# Patient Record
Sex: MALE | Race: WHITE | NOT HISPANIC OR LATINO | URBAN - METROPOLITAN AREA
[De-identification: names, ages, dates, MRNs, and addresses within clinical notes are randomized per-mention and may not be internally consistent; named-entity substitution may affect disease eponyms.]

---

## 2020-10-02 ENCOUNTER — OFFICE VISIT (OUTPATIENT)
Dept: URBAN - METROPOLITAN AREA CLINIC 68 | Facility: CLINIC | Age: 80
End: 2020-10-02

## 2020-10-05 ENCOUNTER — TELEPHONE ENCOUNTER (OUTPATIENT)
Dept: URBAN - METROPOLITAN AREA CLINIC 68 | Facility: CLINIC | Age: 80
End: 2020-10-05

## 2020-10-30 ENCOUNTER — OFFICE VISIT (OUTPATIENT)
Dept: URBAN - METROPOLITAN AREA SURGERY CENTER 12 | Facility: SURGERY CENTER | Age: 80
End: 2020-10-30

## 2020-11-02 ENCOUNTER — OFFICE VISIT (OUTPATIENT)
Dept: URBAN - METROPOLITAN AREA SURGERY CENTER 12 | Facility: SURGERY CENTER | Age: 80
End: 2020-11-02

## 2021-01-01 ENCOUNTER — OFFICE VISIT (OUTPATIENT)
Dept: URBAN - METROPOLITAN AREA CLINIC 68 | Facility: CLINIC | Age: 81
End: 2021-01-01

## 2021-01-21 ENCOUNTER — LAB OUTSIDE AN ENCOUNTER (OUTPATIENT)
Dept: URBAN - METROPOLITAN AREA CLINIC 68 | Facility: CLINIC | Age: 81
End: 2021-01-21

## 2021-02-11 ENCOUNTER — OFFICE VISIT (OUTPATIENT)
Dept: URBAN - METROPOLITAN AREA CLINIC 68 | Facility: CLINIC | Age: 81
End: 2021-02-11

## 2021-02-18 ENCOUNTER — TELEPHONE ENCOUNTER (OUTPATIENT)
Dept: URBAN - METROPOLITAN AREA CLINIC 68 | Facility: CLINIC | Age: 81
End: 2021-02-18

## 2021-03-29 ENCOUNTER — OFFICE VISIT (OUTPATIENT)
Dept: URBAN - METROPOLITAN AREA CLINIC 68 | Facility: CLINIC | Age: 81
End: 2021-03-29

## 2021-04-19 ENCOUNTER — OFFICE VISIT (OUTPATIENT)
Dept: URBAN - METROPOLITAN AREA CLINIC 68 | Facility: CLINIC | Age: 81
End: 2021-04-19

## 2021-09-03 ENCOUNTER — OFFICE VISIT (OUTPATIENT)
Dept: URBAN - METROPOLITAN AREA CLINIC 68 | Facility: CLINIC | Age: 81
End: 2021-09-03

## 2021-09-29 ENCOUNTER — OFFICE VISIT (OUTPATIENT)
Dept: URBAN - METROPOLITAN AREA CLINIC 68 | Facility: CLINIC | Age: 81
End: 2021-09-29

## 2021-10-04 ENCOUNTER — LAB OUTSIDE AN ENCOUNTER (OUTPATIENT)
Dept: URBAN - METROPOLITAN AREA CLINIC 68 | Facility: CLINIC | Age: 81
End: 2021-10-04

## 2021-10-05 ENCOUNTER — OFFICE VISIT (OUTPATIENT)
Dept: URBAN - METROPOLITAN AREA SURGERY CENTER 12 | Facility: SURGERY CENTER | Age: 81
End: 2021-10-05

## 2021-10-06 ENCOUNTER — LAB OUTSIDE AN ENCOUNTER (OUTPATIENT)
Dept: URBAN - METROPOLITAN AREA CLINIC 68 | Facility: CLINIC | Age: 81
End: 2021-10-06

## 2021-10-06 LAB — 01: (no result)

## 2021-10-07 ENCOUNTER — TELEPHONE ENCOUNTER (OUTPATIENT)
Dept: URBAN - METROPOLITAN AREA CLINIC 68 | Facility: CLINIC | Age: 81
End: 2021-10-07

## 2021-10-08 ENCOUNTER — OFFICE VISIT (OUTPATIENT)
Dept: URBAN - METROPOLITAN AREA CLINIC 68 | Facility: CLINIC | Age: 81
End: 2021-10-08

## 2021-10-20 ENCOUNTER — OFFICE VISIT (OUTPATIENT)
Dept: URBAN - METROPOLITAN AREA CLINIC 68 | Facility: CLINIC | Age: 81
End: 2021-10-20

## 2021-11-09 ENCOUNTER — TELEPHONE ENCOUNTER (OUTPATIENT)
Dept: URBAN - METROPOLITAN AREA CLINIC 68 | Facility: CLINIC | Age: 81
End: 2021-11-09

## 2021-11-12 ENCOUNTER — OFFICE VISIT (OUTPATIENT)
Dept: URBAN - METROPOLITAN AREA CLINIC 68 | Facility: CLINIC | Age: 81
End: 2021-11-12

## 2021-11-22 ENCOUNTER — TELEPHONE ENCOUNTER (OUTPATIENT)
Dept: URBAN - METROPOLITAN AREA CLINIC 68 | Facility: CLINIC | Age: 81
End: 2021-11-22

## 2021-11-29 ENCOUNTER — OFFICE VISIT (OUTPATIENT)
Dept: URBAN - METROPOLITAN AREA CLINIC 68 | Facility: CLINIC | Age: 81
End: 2021-11-29

## 2021-11-30 ENCOUNTER — TELEPHONE ENCOUNTER (OUTPATIENT)
Dept: URBAN - METROPOLITAN AREA CLINIC 68 | Facility: CLINIC | Age: 81
End: 2021-11-30

## 2021-12-02 ENCOUNTER — TELEPHONE ENCOUNTER (OUTPATIENT)
Dept: URBAN - METROPOLITAN AREA CLINIC 68 | Facility: CLINIC | Age: 81
End: 2021-12-02

## 2021-12-10 ENCOUNTER — TELEPHONE ENCOUNTER (OUTPATIENT)
Dept: URBAN - METROPOLITAN AREA CLINIC 68 | Facility: CLINIC | Age: 81
End: 2021-12-10

## 2021-12-10 ENCOUNTER — OFFICE VISIT (OUTPATIENT)
Dept: URBAN - METROPOLITAN AREA CLINIC 68 | Facility: CLINIC | Age: 81
End: 2021-12-10

## 2022-01-10 ENCOUNTER — OFFICE VISIT (OUTPATIENT)
Dept: URBAN - METROPOLITAN AREA CLINIC 68 | Facility: CLINIC | Age: 82
End: 2022-01-10

## 2022-01-17 ENCOUNTER — TELEPHONE ENCOUNTER (OUTPATIENT)
Dept: URBAN - METROPOLITAN AREA CLINIC 68 | Facility: CLINIC | Age: 82
End: 2022-01-17

## 2022-01-20 ENCOUNTER — TELEPHONE ENCOUNTER (OUTPATIENT)
Dept: URBAN - METROPOLITAN AREA CLINIC 68 | Facility: CLINIC | Age: 82
End: 2022-01-20

## 2022-01-21 ENCOUNTER — OFFICE VISIT (OUTPATIENT)
Dept: URBAN - METROPOLITAN AREA SURGERY CENTER 12 | Facility: SURGERY CENTER | Age: 82
End: 2022-01-21

## 2022-01-25 ENCOUNTER — LAB OUTSIDE AN ENCOUNTER (OUTPATIENT)
Dept: URBAN - METROPOLITAN AREA CLINIC 68 | Facility: CLINIC | Age: 82
End: 2022-01-25

## 2022-01-25 LAB — 01: (no result)

## 2022-01-27 ENCOUNTER — TELEPHONE ENCOUNTER (OUTPATIENT)
Dept: URBAN - METROPOLITAN AREA CLINIC 68 | Facility: CLINIC | Age: 82
End: 2022-01-27

## 2022-01-27 ENCOUNTER — OFFICE VISIT (OUTPATIENT)
Dept: URBAN - METROPOLITAN AREA CLINIC 68 | Facility: CLINIC | Age: 82
End: 2022-01-27

## 2022-02-02 ENCOUNTER — TELEPHONE ENCOUNTER (OUTPATIENT)
Dept: URBAN - METROPOLITAN AREA CLINIC 68 | Facility: CLINIC | Age: 82
End: 2022-02-02

## 2022-02-03 ENCOUNTER — OFFICE VISIT (OUTPATIENT)
Dept: URBAN - METROPOLITAN AREA SURGERY CENTER 12 | Facility: SURGERY CENTER | Age: 82
End: 2022-02-03

## 2022-02-03 ENCOUNTER — TELEPHONE ENCOUNTER (OUTPATIENT)
Dept: URBAN - METROPOLITAN AREA CLINIC 68 | Facility: CLINIC | Age: 82
End: 2022-02-03

## 2022-02-14 ENCOUNTER — OFFICE VISIT (OUTPATIENT)
Dept: URBAN - METROPOLITAN AREA CLINIC 68 | Facility: CLINIC | Age: 82
End: 2022-02-14

## 2022-04-14 ENCOUNTER — OFFICE VISIT (OUTPATIENT)
Dept: URBAN - METROPOLITAN AREA CLINIC 68 | Facility: CLINIC | Age: 82
End: 2022-04-14

## 2022-05-12 ENCOUNTER — ANTICOAGULATION VISIT (OUTPATIENT)
Dept: CARDIOLOGY | Facility: CLINIC | Age: 82
End: 2022-05-12

## 2022-05-19 ENCOUNTER — TELEPHONE ENCOUNTER (OUTPATIENT)
Dept: URBAN - METROPOLITAN AREA CLINIC 68 | Facility: CLINIC | Age: 82
End: 2022-05-19

## 2022-06-04 ENCOUNTER — TELEPHONE ENCOUNTER (OUTPATIENT)
Dept: URBAN - METROPOLITAN AREA CLINIC 68 | Facility: CLINIC | Age: 82
End: 2022-06-04

## 2022-06-04 RX ORDER — POLYETHYLENE GLYCOL 3350, SODIUM SULFATE, SODIUM CHLORIDE, POTASSIUM CHLORIDE, ASCORBIC ACID, SODIUM ASCORBATE 7.5-2.691G
KIT ORAL AS DIRECTED
Qty: 1 | Refills: 0 | OUTPATIENT
Start: 2015-07-13 | End: 2015-07-14

## 2022-06-04 RX ORDER — OMEPRAZOLE 20 MG/1
OMEPRAZOLE( 20MG ORAL 1 DAILY ) INACTIVE -HX ENTRY TABLET, DELAYED RELEASE ORAL DAILY
OUTPATIENT
Start: 2021-09-29

## 2022-06-04 RX ORDER — AMOXICILLIN AND CLAVULANATE POTASSIUM 875; 125 MG/1; MG/1
AMOXICILLIN-POT CLAVULANATE( 875-125MG ORAL   ) INACTIVE -HX ENTRY TABLET, FILM COATED ORAL
OUTPATIENT
Start: 2021-11-09

## 2022-06-04 RX ORDER — RIVAROXABAN 10 MG/1
XARELTO( 10MG ORAL  DAILY ) INACTIVE -HX ENTRY TABLET, FILM COATED ORAL DAILY
OUTPATIENT
Start: 2020-10-02

## 2022-06-04 RX ORDER — FLUNISOLIDE 0.25 MG/ML
FLUNISOLIDE( 25 MCG/ACT(0.025%) NASAL  TWO TIMES DAILY ) INACTIVE -HX ENTRY SOLUTION NASAL
OUTPATIENT
Start: 2020-10-02

## 2022-06-04 RX ORDER — CETIRIZINE HYDROCHLORIDE 10 MG/1
CETIRIZINE HCL( 10MG ORAL 1 DAILY ) INACTIVE -HX ENTRY TABLET, FILM COATED ORAL DAILY
OUTPATIENT
Start: 2021-09-29

## 2022-06-04 RX ORDER — LISINOPRIL AND HYDROCHLOROTHIAZIDE TABLETS 20; 25 MG/1; MG/1
LISINOPRIL-HYDROCHLOROTHIAZIDE( 20-25MG ORAL  DAILY ) INACTIVE -HX ENTRY TABLET ORAL DAILY
OUTPATIENT
Start: 2020-10-02

## 2022-06-04 RX ORDER — POLYETHYLENE GLYCOL 3350 17 G/DOSE
POWDER (GRAM) ORAL
Qty: 1 | Refills: 0 | OUTPATIENT
Start: 2021-02-11 | End: 2021-03-13

## 2022-06-04 RX ORDER — SODIUM SULFATE, POTASSIUM SULFATE, MAGNESIUM SULFATE 17.5; 3.13; 1.6 G/ML; G/ML; G/ML
SOLUTION, CONCENTRATE ORAL AS DIRECTED
Qty: 1 | Refills: 0 | OUTPATIENT
Start: 2020-10-02 | End: 2020-10-03

## 2022-06-04 RX ORDER — FAMOTIDINE 10 MG/1
TABLET, FILM COATED ORAL
Qty: 60 | Refills: 0 | OUTPATIENT
Start: 2021-10-08 | End: 2021-11-07

## 2022-06-05 ENCOUNTER — TELEPHONE ENCOUNTER (OUTPATIENT)
Dept: URBAN - METROPOLITAN AREA CLINIC 68 | Facility: CLINIC | Age: 82
End: 2022-06-05

## 2022-06-05 RX ORDER — OMEPRAZOLE 40 MG/1
CAPSULE, DELAYED RELEASE PELLETS ORAL EVERY 12 HOURS
Qty: 180 | Refills: 180 | Status: ACTIVE | COMMUNITY
Start: 2022-02-14

## 2022-06-05 RX ORDER — GUAIFENESIN 400 MG 400 MG/1
GUAIFENESIN( 400MG ORAL 1 TWICE A DAY ) ACTIVE -HX ENTRY TABLET ORAL TWICE A DAY
Status: ACTIVE | COMMUNITY
Start: 2022-02-14

## 2022-06-05 RX ORDER — HYDRALAZINE HYDROCHLORIDE 25 MG/1
HYDRALAZINE HCL( 25MG ORAL   ) ACTIVE -HX ENTRY TABLET ORAL
Status: ACTIVE | COMMUNITY
Start: 2022-02-14

## 2022-06-05 RX ORDER — LISINOPRIL 40 MG/1
LISINOPRIL( 40MG ORAL 1 DAILY ) ACTIVE -HX ENTRY TABLET ORAL DAILY
Status: ACTIVE | COMMUNITY
Start: 2022-02-14

## 2022-06-05 RX ORDER — DABIGATRAN ETEXILATE MESYLATE 150 MG/1
PRADAXA( 150MG ORAL 1 TWICE A DAY ) ACTIVE -HX ENTRY CAPSULE ORAL TWICE A DAY
Status: ACTIVE | COMMUNITY
Start: 2022-02-14

## 2022-06-05 RX ORDER — BUDESONIDE AND FORMOTEROL FUMARATE DIHYDRATE 160; 4.5 UG/1; UG/1
SYMBICORT( 160-4.5MCG/ACT INHALATION  TWO TIMES DAILY ) ACTIVE -HX ENTRY AEROSOL RESPIRATORY (INHALATION)
Status: ACTIVE | COMMUNITY
Start: 2022-02-14

## 2022-06-05 RX ORDER — ROSUVASTATIN CALCIUM 40 MG/1
ROSUVASTATIN CALCIUM( 40MG ORAL  DAILY ) ACTIVE -HX ENTRY TABLET, FILM COATED ORAL DAILY
Status: ACTIVE | COMMUNITY
Start: 2022-02-14

## 2022-06-05 RX ORDER — FINASTERIDE 5 MG/1
FINASTERIDE( 5MG ORAL   ) ACTIVE -HX ENTRY TABLET, FILM COATED ORAL
Status: ACTIVE | COMMUNITY
Start: 2022-02-14

## 2022-06-05 RX ORDER — SILODOSIN 4 MG/1
SILODOSIN( 4MG ORAL   ) ACTIVE -HX ENTRY CAPSULE ORAL
Status: ACTIVE | COMMUNITY
Start: 2022-02-14

## 2022-06-05 RX ORDER — AMLODIPINE BESYLATE 10 MG/1
AMLODIPINE BESYLATE( 10MG ORAL 1/2 DAILY ) ACTIVE -HX ENTRY TABLET ORAL DAILY
Status: ACTIVE | COMMUNITY
Start: 2022-02-14

## 2022-06-05 RX ORDER — ALPRAZOLAM 0.5 MG/1
ALPRAZOLAM( 0.5MG ORAL 1 THREE TIMES ) ACTIVE -HX ENTRY TABLET ORAL
Status: ACTIVE | COMMUNITY
Start: 2022-02-14

## 2022-06-09 ENCOUNTER — OFFICE VISIT (OUTPATIENT)
Dept: URBAN - METROPOLITAN AREA CLINIC 68 | Facility: CLINIC | Age: 82
End: 2022-06-09

## 2022-06-23 ENCOUNTER — OFFICE VISIT (OUTPATIENT)
Dept: URBAN - METROPOLITAN AREA CLINIC 68 | Facility: CLINIC | Age: 82
End: 2022-06-23

## 2022-06-25 ENCOUNTER — TELEPHONE ENCOUNTER (OUTPATIENT)
Age: 82
End: 2022-06-25

## 2022-06-25 RX ORDER — TAMSULOSIN HYDROCHLORIDE 0.4 MG/1
TAMSULOSIN HCL( 0.4MG ORAL 1 DAILY ) INACTIVE -HX ENTRY CAPSULE ORAL DAILY
OUTPATIENT
Start: 2021-09-29

## 2022-06-25 RX ORDER — FLUNISOLIDE 0.25 MG/ML
FLUNISOLIDE( 25 MCG/ACT(0.025%) NASAL  TWO TIMES DAILY ) INACTIVE -HX ENTRY SOLUTION NASAL
OUTPATIENT
Start: 2020-10-02

## 2022-06-25 RX ORDER — LISINOPRIL AND HYDROCHLOROTHIAZIDE TABLETS 20; 25 MG/1; MG/1
LISINOPRIL-HYDROCHLOROTHIAZIDE( 20-25MG ORAL  DAILY ) INACTIVE -HX ENTRY TABLET ORAL DAILY
OUTPATIENT
Start: 2020-10-02

## 2022-06-25 RX ORDER — AMOXICILLIN AND CLAVULANATE POTASSIUM 875; 125 MG/1; MG/1
AMOXICILLIN-POT CLAVULANATE( 875-125MG ORAL   ) INACTIVE -HX ENTRY TABLET, COATED ORAL
OUTPATIENT
Start: 2021-11-09

## 2022-06-25 RX ORDER — OMEPRAZOLE 20 MG/1
OMEPRAZOLE( 20MG ORAL 1 DAILY ) INACTIVE -HX ENTRY TABLET, DELAYED RELEASE ORAL DAILY
OUTPATIENT
Start: 2021-09-29

## 2022-06-25 RX ORDER — SODIUM SULFATE, POTASSIUM SULFATE, MAGNESIUM SULFATE 17.5; 3.13; 1.6 G/ML; G/ML; G/ML
SOLUTION, CONCENTRATE ORAL AS DIRECTED
Qty: 1 | Refills: 0 | OUTPATIENT
Start: 2020-10-02 | End: 2020-10-03

## 2022-06-25 RX ORDER — FAMOTIDINE 10 MG/1
TABLET, FILM COATED ORAL
Qty: 60 | Refills: 0 | OUTPATIENT
Start: 2021-10-08 | End: 2021-11-07

## 2022-06-25 RX ORDER — RIVAROXABAN 10 MG/1
XARELTO( 10MG ORAL  DAILY ) INACTIVE -HX ENTRY TABLET, FILM COATED ORAL DAILY
OUTPATIENT
Start: 2020-10-02

## 2022-06-25 RX ORDER — CETIRIZINE HYDROCHLORIDE 10 MG/1
CETIRIZINE HCL( 10MG ORAL 1 DAILY ) INACTIVE -HX ENTRY TABLET, FILM COATED ORAL DAILY
OUTPATIENT
Start: 2021-09-29

## 2022-06-25 RX ORDER — LORATADINE 5 MG
TABLET,CHEWABLE ORAL
Qty: 1 | Refills: 0 | OUTPATIENT
Start: 2021-02-11 | End: 2021-03-13

## 2022-06-25 RX ORDER — POLYETHYLENE GLYCOL 3350, SODIUM SULFATE, SODIUM CHLORIDE, POTASSIUM CHLORIDE, ASCORBIC ACID, SODIUM ASCORBATE 7.5-2.691G
KIT ORAL AS DIRECTED
Qty: 1 | Refills: 0 | OUTPATIENT
Start: 2015-07-13 | End: 2015-07-14

## 2022-06-26 ENCOUNTER — TELEPHONE ENCOUNTER (OUTPATIENT)
Age: 82
End: 2022-06-26

## 2022-06-26 RX ORDER — HYDRALAZINE HYDROCHLORIDE 25 MG/1
HYDRALAZINE HCL( 25MG ORAL   ) ACTIVE -HX ENTRY TABLET ORAL
Status: ACTIVE | COMMUNITY
Start: 2022-06-23

## 2022-06-26 RX ORDER — LISINOPRIL 40 MG/1
LISINOPRIL( 40MG ORAL 1 DAILY ) ACTIVE -HX ENTRY TABLET ORAL DAILY
Status: ACTIVE | COMMUNITY
Start: 2022-06-23

## 2022-06-26 RX ORDER — DABIGATRAN ETEXILATE MESYLATE 150 MG/1
PRADAXA( 150MG ORAL 1 TWICE A DAY ) ACTIVE -HX ENTRY CAPSULE ORAL TWICE A DAY
Status: ACTIVE | COMMUNITY
Start: 2022-06-23

## 2022-06-26 RX ORDER — OMEPRAZOLE 40 MG/1
CAPSULE, DELAYED RELEASE ORAL EVERY 12 HOURS
Qty: 180 | Refills: 180 | Status: ACTIVE | COMMUNITY
Start: 2022-02-14

## 2022-06-26 RX ORDER — GUAIFENESIN 400 MG/1
GUAIFENESIN( 400MG ORAL 1 TWICE A DAY ) ACTIVE -HX ENTRY TABLET ORAL TWICE A DAY
Status: ACTIVE | COMMUNITY
Start: 2022-06-23

## 2022-06-26 RX ORDER — POTASSIUM CHLORIDE 750 MG/1
POTASSIUM CHLORIDE( 10MEQ ORAL  DAILY ) ACTIVE -HX ENTRY TABLET, EXTENDED RELEASE ORAL DAILY
Status: ACTIVE | COMMUNITY
Start: 2022-06-23

## 2022-06-26 RX ORDER — FAMOTIDINE 40 MG/1
TABLET, FILM COATED ORAL DAILY
Qty: 90 | Refills: 0 | Status: ACTIVE | COMMUNITY
Start: 2022-06-23

## 2022-06-26 RX ORDER — SILODOSIN 4 MG/1
SILODOSIN( 4MG ORAL   ) ACTIVE -HX ENTRY CAPSULE ORAL
Status: ACTIVE | COMMUNITY
Start: 2022-06-23

## 2022-06-26 RX ORDER — ALPRAZOLAM 0.5 MG/1
ALPRAZOLAM( 0.5MG ORAL 1 THREE TIMES ) ACTIVE -HX ENTRY TABLET ORAL
Status: ACTIVE | COMMUNITY
Start: 2022-06-23

## 2022-06-26 RX ORDER — AMLODIPINE BESYLATE 10 MG/1
AMLODIPINE BESYLATE( 10MG ORAL 1/2 DAILY ) ACTIVE -HX ENTRY TABLET ORAL DAILY
Status: ACTIVE | COMMUNITY
Start: 2022-06-23

## 2022-06-26 RX ORDER — FINASTERIDE 5 MG/1
FINASTERIDE( 5MG ORAL   ) ACTIVE -HX ENTRY TABLET, FILM COATED ORAL
Status: ACTIVE | COMMUNITY
Start: 2022-06-23

## 2022-06-26 RX ORDER — ROSUVASTATIN CALCIUM 40 MG/1
ROSUVASTATIN CALCIUM( 40MG ORAL  DAILY ) ACTIVE -HX ENTRY TABLET, FILM COATED ORAL DAILY
Status: ACTIVE | COMMUNITY
Start: 2022-06-23

## 2022-07-14 ENCOUNTER — OFFICE VISIT (OUTPATIENT)
Dept: URBAN - METROPOLITAN AREA CLINIC 68 | Facility: CLINIC | Age: 82
End: 2022-07-14

## 2022-07-14 RX ORDER — ROSUVASTATIN CALCIUM 40 MG/1
ROSUVASTATIN CALCIUM( 40MG ORAL  DAILY ) ACTIVE -HX ENTRY TABLET, FILM COATED ORAL DAILY
COMMUNITY
Start: 2022-02-14

## 2022-07-14 RX ORDER — SILODOSIN 4 MG/1
SILODOSIN( 4MG ORAL   ) ACTIVE -HX ENTRY CAPSULE ORAL
COMMUNITY
Start: 2022-02-14

## 2022-07-14 RX ORDER — BUDESONIDE AND FORMOTEROL FUMARATE DIHYDRATE 160; 4.5 UG/1; UG/1
SYMBICORT( 160-4.5MCG/ACT INHALATION  TWO TIMES DAILY ) ACTIVE -HX ENTRY AEROSOL RESPIRATORY (INHALATION)
COMMUNITY
Start: 2022-02-14

## 2022-07-14 RX ORDER — OMEPRAZOLE 40 MG/1
CAPSULE, DELAYED RELEASE PELLETS ORAL EVERY 12 HOURS
Qty: 180 | Refills: 180 | COMMUNITY
Start: 2022-02-14

## 2022-07-14 RX ORDER — HYDRALAZINE HYDROCHLORIDE 25 MG/1
HYDRALAZINE HCL( 25MG ORAL   ) ACTIVE -HX ENTRY TABLET ORAL
COMMUNITY
Start: 2022-02-14

## 2022-07-14 RX ORDER — FINASTERIDE 5 MG/1
FINASTERIDE( 5MG ORAL   ) ACTIVE -HX ENTRY TABLET, FILM COATED ORAL
COMMUNITY
Start: 2022-02-14

## 2022-07-14 RX ORDER — AMLODIPINE BESYLATE 10 MG/1
AMLODIPINE BESYLATE( 10MG ORAL 1/2 DAILY ) ACTIVE -HX ENTRY TABLET ORAL DAILY
COMMUNITY
Start: 2022-02-14

## 2022-07-14 RX ORDER — LISINOPRIL 40 MG/1
LISINOPRIL( 40MG ORAL 1 DAILY ) ACTIVE -HX ENTRY TABLET ORAL DAILY
COMMUNITY
Start: 2022-02-14

## 2022-07-14 RX ORDER — ALPRAZOLAM 0.5 MG/1
ALPRAZOLAM( 0.5MG ORAL 1 THREE TIMES ) ACTIVE -HX ENTRY TABLET ORAL
COMMUNITY
Start: 2022-02-14

## 2022-07-14 RX ORDER — DABIGATRAN ETEXILATE MESYLATE 150 MG/1
PRADAXA( 150MG ORAL 1 TWICE A DAY ) ACTIVE -HX ENTRY CAPSULE ORAL TWICE A DAY
COMMUNITY
Start: 2022-02-14

## 2022-07-14 RX ORDER — GUAIFENESIN 400 MG 400 MG/1
GUAIFENESIN( 400MG ORAL 1 TWICE A DAY ) ACTIVE -HX ENTRY TABLET ORAL TWICE A DAY
COMMUNITY
Start: 2022-02-14

## 2022-07-14 NOTE — HPI-MIGRATED HPI
General : Patient has been experiencing dyspepsia. He was started on a short course of Ppi and this seemed to help. Since he was concern with side effect Ppi were DC. He did not tolerate omeprazole. On the last appointment he was feelig  bloated gas and belching. He was stated on FD guard and H2 bockers.  He refers shce our last appoiintment he has stop using miralax. He refers that since then all his symtoms have resolve. He is not on PPi H2 blockers or the FD guard. He feels fine. He denies dysphagia no belching or bloating. He stop using PPi h2 blocker about 4 days ago    He comes in for follow up. Denies  bright red blood per rectum   (hematochezia) nor black/tarry stools (melena). He also denied any unexplained significant weight  loss, frequent nausea, vomits, early satiety, tenesmus, rectal pain and  changes in bowel habits or stool caliber. He did not complain of episodes of diarrhea interspersed with constipation.

## 2022-09-19 ENCOUNTER — ERX REFILL RESPONSE (OUTPATIENT)
Dept: URBAN - METROPOLITAN AREA CLINIC 68 | Facility: CLINIC | Age: 82
End: 2022-09-19

## 2022-09-19 RX ORDER — FAMOTIDINE 40 MG/1
TAKE 1 TABLET BY MOUTH EVERY DAY TABLET, FILM COATED ORAL
Qty: 90 TABLET | Refills: 0 | OUTPATIENT

## 2023-04-03 ENCOUNTER — OFFICE VISIT (OUTPATIENT)
Dept: URBAN - METROPOLITAN AREA CLINIC 68 | Facility: CLINIC | Age: 83
End: 2023-04-03

## 2023-06-14 ENCOUNTER — OFFICE VISIT (OUTPATIENT)
Dept: URBAN - METROPOLITAN AREA CLINIC 68 | Facility: CLINIC | Age: 83
End: 2023-06-14
Payer: MEDICARE

## 2023-06-14 ENCOUNTER — WEB ENCOUNTER (OUTPATIENT)
Dept: URBAN - METROPOLITAN AREA CLINIC 68 | Facility: CLINIC | Age: 83
End: 2023-06-14

## 2023-06-14 VITALS
BODY MASS INDEX: 22.91 KG/M2 | SYSTOLIC BLOOD PRESSURE: 220 MMHG | WEIGHT: 146 LBS | HEIGHT: 67 IN | DIASTOLIC BLOOD PRESSURE: 90 MMHG

## 2023-06-14 DIAGNOSIS — Z86.010 HISTORY OF COLON POLYPS: ICD-10-CM

## 2023-06-14 DIAGNOSIS — K59.00 ACUTE CONSTIPATION: ICD-10-CM

## 2023-06-14 DIAGNOSIS — R14.2 BELCHING: ICD-10-CM

## 2023-06-14 DIAGNOSIS — R13.19 ESOPHAGEAL DYSPHAGIA: ICD-10-CM

## 2023-06-14 PROBLEM — 271834000: Status: ACTIVE | Noted: 2023-06-14

## 2023-06-14 PROCEDURE — 99214 OFFICE O/P EST MOD 30 MIN: CPT | Performed by: INTERNAL MEDICINE

## 2023-06-14 RX ORDER — HYDRALAZINE HYDROCHLORIDE 25 MG/1
HYDRALAZINE HCL( 25MG ORAL   ) ACTIVE -HX ENTRY TABLET ORAL
Status: ACTIVE | COMMUNITY
Start: 2022-02-14

## 2023-06-14 RX ORDER — FAMOTIDINE 40 MG/1
TAKE 1 TABLET BY MOUTH EVERY DAY TABLET, FILM COATED ORAL
Qty: 90 TABLET | Refills: 0 | Status: ACTIVE | COMMUNITY

## 2023-06-14 RX ORDER — SILODOSIN 4 MG/1
SILODOSIN( 4MG ORAL   ) ACTIVE -HX ENTRY CAPSULE ORAL
Status: ACTIVE | COMMUNITY
Start: 2022-02-14

## 2023-06-14 RX ORDER — ROSUVASTATIN CALCIUM 40 MG/1
ROSUVASTATIN CALCIUM( 40MG ORAL  DAILY ) ACTIVE -HX ENTRY TABLET, FILM COATED ORAL DAILY
Status: ACTIVE | COMMUNITY
Start: 2022-02-14

## 2023-06-14 RX ORDER — DABIGATRAN ETEXILATE MESYLATE 150 MG/1
PRADAXA( 150MG ORAL 1 TWICE A DAY ) ACTIVE -HX ENTRY CAPSULE ORAL TWICE A DAY
Status: ACTIVE | COMMUNITY
Start: 2022-02-14

## 2023-06-14 RX ORDER — AMLODIPINE BESYLATE 10 MG/1
AMLODIPINE BESYLATE( 10MG ORAL 1/2 DAILY ) ACTIVE -HX ENTRY TABLET ORAL DAILY
Status: ACTIVE | COMMUNITY
Start: 2022-02-14

## 2023-06-14 RX ORDER — PANTOPRAZOLE SODIUM 40 MG/1
1 TABLET TABLET, DELAYED RELEASE ORAL EVERY 12 HOURS
Qty: 180 TABLET | OUTPATIENT
Start: 2023-06-14

## 2023-06-14 RX ORDER — FINASTERIDE 5 MG/1
FINASTERIDE( 5MG ORAL   ) ACTIVE -HX ENTRY TABLET, FILM COATED ORAL
Status: ACTIVE | COMMUNITY
Start: 2022-02-14

## 2023-06-14 RX ORDER — ALPRAZOLAM 0.5 MG/1
ALPRAZOLAM( 0.5MG ORAL 1 THREE TIMES ) ACTIVE -HX ENTRY TABLET ORAL
Status: ACTIVE | COMMUNITY
Start: 2022-02-14

## 2023-06-14 RX ORDER — GUAIFENESIN 400 MG 400 MG/1
GUAIFENESIN( 400MG ORAL 1 TWICE A DAY ) ACTIVE -HX ENTRY TABLET ORAL TWICE A DAY
Status: ACTIVE | COMMUNITY
Start: 2022-02-14

## 2023-06-14 RX ORDER — LISINOPRIL 40 MG/1
LISINOPRIL( 40MG ORAL 1 DAILY ) ACTIVE -HX ENTRY TABLET ORAL DAILY
Status: ACTIVE | COMMUNITY
Start: 2022-02-14

## 2023-06-14 RX ORDER — BUDESONIDE AND FORMOTEROL FUMARATE DIHYDRATE 160; 4.5 UG/1; UG/1
SYMBICORT( 160-4.5MCG/ACT INHALATION  TWO TIMES DAILY ) ACTIVE -HX ENTRY AEROSOL RESPIRATORY (INHALATION)
Status: ACTIVE | COMMUNITY
Start: 2022-02-14

## 2023-06-14 RX ORDER — OMEPRAZOLE 40 MG/1
CAPSULE, DELAYED RELEASE PELLETS ORAL EVERY 12 HOURS
Qty: 180 | Refills: 180 | Status: ACTIVE | COMMUNITY
Start: 2022-02-14

## 2023-06-14 NOTE — HPI-TODAY'S VISIT:
Case of a male patient that comes due to dysphagia to solids.  Patient refers that for about 6 to 7 weeks he has been having difficulty swallowing solids.  He refers foods get stuck in the epigastric region.  This has been stable none worsening ongoing for years.  He refers he is on a soft diet due to this.  He also acknowledges that for the past several days he has been having worsening belching and bloating.  Furthermore he has been having worsening constipation.  Denies melena hematochezia hematemesis coffee-ground emesis.

## 2023-06-21 ENCOUNTER — OFFICE VISIT (OUTPATIENT)
Dept: URBAN - METROPOLITAN AREA CLINIC 68 | Facility: CLINIC | Age: 83
End: 2023-06-21
Payer: MEDICARE

## 2023-06-21 VITALS
HEIGHT: 67 IN | WEIGHT: 146 LBS | DIASTOLIC BLOOD PRESSURE: 80 MMHG | SYSTOLIC BLOOD PRESSURE: 190 MMHG | BODY MASS INDEX: 22.91 KG/M2

## 2023-06-21 DIAGNOSIS — R10.13 EPIGASTRIC PAIN: ICD-10-CM

## 2023-06-21 DIAGNOSIS — K59.00 ACUTE CONSTIPATION: ICD-10-CM

## 2023-06-21 DIAGNOSIS — R13.19 ESOPHAGEAL DYSPHAGIA: ICD-10-CM

## 2023-06-21 PROCEDURE — 99214 OFFICE O/P EST MOD 30 MIN: CPT | Performed by: INTERNAL MEDICINE

## 2023-06-21 RX ORDER — LISINOPRIL 40 MG/1
LISINOPRIL( 40MG ORAL 1 DAILY ) ACTIVE -HX ENTRY TABLET ORAL DAILY
Status: ACTIVE | COMMUNITY
Start: 2022-02-14

## 2023-06-21 RX ORDER — AMLODIPINE BESYLATE 10 MG/1
AMLODIPINE BESYLATE( 10MG ORAL 1/2 DAILY ) ACTIVE -HX ENTRY TABLET ORAL DAILY
Status: ACTIVE | COMMUNITY
Start: 2022-02-14

## 2023-06-21 RX ORDER — HYDRALAZINE HYDROCHLORIDE 25 MG/1
HYDRALAZINE HCL( 25MG ORAL   ) ACTIVE -HX ENTRY TABLET ORAL
Status: ACTIVE | COMMUNITY
Start: 2022-02-14

## 2023-06-21 RX ORDER — PANTOPRAZOLE SODIUM 40 MG/1
1 TABLET TABLET, DELAYED RELEASE ORAL EVERY 12 HOURS
Qty: 180 TABLET | Status: ACTIVE | COMMUNITY
Start: 2023-06-14

## 2023-06-21 RX ORDER — ROSUVASTATIN CALCIUM 40 MG/1
ROSUVASTATIN CALCIUM( 40MG ORAL  DAILY ) ACTIVE -HX ENTRY TABLET, FILM COATED ORAL DAILY
Status: ACTIVE | COMMUNITY
Start: 2022-02-14

## 2023-06-21 RX ORDER — FINASTERIDE 5 MG/1
FINASTERIDE( 5MG ORAL   ) ACTIVE -HX ENTRY TABLET, FILM COATED ORAL
Status: ACTIVE | COMMUNITY
Start: 2022-02-14

## 2023-06-21 RX ORDER — FAMOTIDINE 40 MG/1
TAKE 1 TABLET BY MOUTH EVERY DAY TABLET, FILM COATED ORAL
Qty: 90 TABLET | Refills: 0 | Status: ACTIVE | COMMUNITY

## 2023-06-21 RX ORDER — OMEPRAZOLE 40 MG/1
CAPSULE, DELAYED RELEASE PELLETS ORAL EVERY 12 HOURS
Qty: 180 | Refills: 180 | Status: ACTIVE | COMMUNITY
Start: 2022-02-14

## 2023-06-21 RX ORDER — DABIGATRAN ETEXILATE MESYLATE 150 MG/1
PRADAXA( 150MG ORAL 1 TWICE A DAY ) ACTIVE -HX ENTRY CAPSULE ORAL TWICE A DAY
Status: ACTIVE | COMMUNITY
Start: 2022-02-14

## 2023-06-21 RX ORDER — SILODOSIN 4 MG/1
SILODOSIN( 4MG ORAL   ) ACTIVE -HX ENTRY CAPSULE ORAL
Status: ACTIVE | COMMUNITY
Start: 2022-02-14

## 2023-06-21 RX ORDER — GUAIFENESIN 400 MG 400 MG/1
GUAIFENESIN( 400MG ORAL 1 TWICE A DAY ) ACTIVE -HX ENTRY TABLET ORAL TWICE A DAY
Status: ACTIVE | COMMUNITY
Start: 2022-02-14

## 2023-06-21 RX ORDER — ALPRAZOLAM 0.5 MG/1
ALPRAZOLAM( 0.5MG ORAL 1 THREE TIMES ) ACTIVE -HX ENTRY TABLET ORAL
Status: ACTIVE | COMMUNITY
Start: 2022-02-14

## 2023-06-21 RX ORDER — BUDESONIDE AND FORMOTEROL FUMARATE DIHYDRATE 160; 4.5 UG/1; UG/1
SYMBICORT( 160-4.5MCG/ACT INHALATION  TWO TIMES DAILY ) ACTIVE -HX ENTRY AEROSOL RESPIRATORY (INHALATION)
Status: ACTIVE | COMMUNITY
Start: 2022-02-14

## 2023-06-21 NOTE — HPI-TODAY'S VISIT:
Case of a male patient that comes due to dysphagia to solids.  Patient refers that for many years he has been having difficulty swallowing solids.  He refers foods get stuck in the epigastric region.  This has been stable none worsening ongoing for years.  He refers he is on a soft diet due to this.  He also acknowledges was having  worsening belching and bloating.  Furthermore he has been having worsening constipation. He was started on PPi and miralax. He also underwent neck imaging. He comes in today for follow up. He refers that his dysphagia is stable and has not progress. He is still on soft foods. He refers that his abdominal pain has improve/resolve on Ppi. His constipation has resolve on Miralax. He comes in today for follow up.       Denies melena hematochezia hematemesis coffee-ground emesis.

## 2023-06-21 NOTE — PHYSICAL EXAM NECK/THYROID:
normal appearance , without tenderness upon palpation , no deformities , trachea midline , Thyroid normal size , no masses , thyroid nontender
Initial (On Arrival)

## 2023-07-07 ENCOUNTER — OFFICE VISIT (OUTPATIENT)
Dept: URBAN - METROPOLITAN AREA CLINIC 68 | Facility: CLINIC | Age: 83
End: 2023-07-07
Payer: MEDICARE

## 2023-07-07 VITALS
SYSTOLIC BLOOD PRESSURE: 150 MMHG | DIASTOLIC BLOOD PRESSURE: 70 MMHG | WEIGHT: 146 LBS | HEIGHT: 67 IN | BODY MASS INDEX: 22.91 KG/M2

## 2023-07-07 DIAGNOSIS — Z79.01 CHRONIC ANTICOAGULATION: ICD-10-CM

## 2023-07-07 DIAGNOSIS — K59.00 ACUTE CONSTIPATION: ICD-10-CM

## 2023-07-07 DIAGNOSIS — R10.13 EPIGASTRIC PAIN: ICD-10-CM

## 2023-07-07 DIAGNOSIS — R13.19 ESOPHAGEAL DYSPHAGIA: ICD-10-CM

## 2023-07-07 PROCEDURE — 99213 OFFICE O/P EST LOW 20 MIN: CPT | Performed by: INTERNAL MEDICINE

## 2023-07-07 RX ORDER — SILODOSIN 4 MG/1
SILODOSIN( 4MG ORAL   ) ACTIVE -HX ENTRY CAPSULE ORAL
Status: ACTIVE | COMMUNITY
Start: 2022-02-14

## 2023-07-07 RX ORDER — LISINOPRIL 40 MG/1
LISINOPRIL( 40MG ORAL 1 DAILY ) ACTIVE -HX ENTRY TABLET ORAL DAILY
Status: ACTIVE | COMMUNITY
Start: 2022-02-14

## 2023-07-07 RX ORDER — AMLODIPINE BESYLATE 10 MG/1
AMLODIPINE BESYLATE( 10MG ORAL 1/2 DAILY ) ACTIVE -HX ENTRY TABLET ORAL DAILY
Status: ACTIVE | COMMUNITY
Start: 2022-02-14

## 2023-07-07 RX ORDER — DABIGATRAN ETEXILATE MESYLATE 150 MG/1
PRADAXA( 150MG ORAL 1 TWICE A DAY ) ACTIVE -HX ENTRY CAPSULE ORAL TWICE A DAY
Status: ACTIVE | COMMUNITY
Start: 2022-02-14

## 2023-07-07 RX ORDER — BUDESONIDE AND FORMOTEROL FUMARATE DIHYDRATE 160; 4.5 UG/1; UG/1
SYMBICORT( 160-4.5MCG/ACT INHALATION  TWO TIMES DAILY ) ACTIVE -HX ENTRY AEROSOL RESPIRATORY (INHALATION)
Status: ACTIVE | COMMUNITY
Start: 2022-02-14

## 2023-07-07 RX ORDER — FAMOTIDINE 40 MG/1
TAKE 1 TABLET BY MOUTH EVERY DAY TABLET, FILM COATED ORAL
Qty: 90 TABLET | Refills: 0 | Status: ACTIVE | COMMUNITY

## 2023-07-07 RX ORDER — GUAIFENESIN 400 MG 400 MG/1
GUAIFENESIN( 400MG ORAL 1 TWICE A DAY ) ACTIVE -HX ENTRY TABLET ORAL TWICE A DAY
Status: ACTIVE | COMMUNITY
Start: 2022-02-14

## 2023-07-07 RX ORDER — PANTOPRAZOLE SODIUM 40 MG/1
1 TABLET TABLET, DELAYED RELEASE ORAL EVERY 12 HOURS
Qty: 180 TABLET | Status: ACTIVE | COMMUNITY
Start: 2023-06-14

## 2023-07-07 RX ORDER — FINASTERIDE 5 MG/1
FINASTERIDE( 5MG ORAL   ) ACTIVE -HX ENTRY TABLET, FILM COATED ORAL
Status: ACTIVE | COMMUNITY
Start: 2022-02-14

## 2023-07-07 RX ORDER — ALPRAZOLAM 0.5 MG/1
ALPRAZOLAM( 0.5MG ORAL 1 THREE TIMES ) ACTIVE -HX ENTRY TABLET ORAL
Status: ACTIVE | COMMUNITY
Start: 2022-02-14

## 2023-07-07 RX ORDER — OMEPRAZOLE 40 MG/1
CAPSULE, DELAYED RELEASE PELLETS ORAL EVERY 12 HOURS
Qty: 180 | Refills: 180 | Status: ACTIVE | COMMUNITY
Start: 2022-02-14

## 2023-07-07 RX ORDER — ROSUVASTATIN CALCIUM 40 MG/1
ROSUVASTATIN CALCIUM( 40MG ORAL  DAILY ) ACTIVE -HX ENTRY TABLET, FILM COATED ORAL DAILY
Status: ACTIVE | COMMUNITY
Start: 2022-02-14

## 2023-07-07 RX ORDER — HYDRALAZINE HYDROCHLORIDE 25 MG/1
HYDRALAZINE HCL( 25MG ORAL   ) ACTIVE -HX ENTRY TABLET ORAL
Status: ACTIVE | COMMUNITY
Start: 2022-02-14

## 2023-07-07 NOTE — HPI-TODAY'S VISIT:
Case of a male patient that comes due to dysphagia to solids.  Patient refers that for many years he has been having difficulty swallowing solids.  He refers foods get stuck in the epigastric region.  This has been stable none worsening ongoing for years.  He refers he is on a soft diet due to this.  He also acknowledges was having  worsening belching and bloating.  Furthermore he has been having worsening constipation. He was started on PPi and miralax. PPi seems to have helped his belching and bloating. His constipation resolve on Miralax.  He also underwent neck imaging. He comes in today for follow up.  He refers today that his abdominal pain is better.  He continues on PPI twice daily.  Continue on soft food due to his dysphagia symptoms that have not progressed.  Underwent thyroid ultrasound which was normal as per patient.  Constipation has resolved on MiraLAX.  Continues on MiraLAX daily. Denies melena hematochezia hematemesis coffee-ground emesis.

## 2023-07-09 PROBLEM — 197119006: Status: ACTIVE | Noted: 2023-06-14

## 2023-07-24 ENCOUNTER — OFFICE VISIT (OUTPATIENT)
Dept: URBAN - METROPOLITAN AREA CLINIC 68 | Facility: CLINIC | Age: 83
End: 2023-07-24

## 2023-08-02 PROBLEM — 365767001: Status: ACTIVE | Noted: 2023-08-02

## 2023-08-04 ENCOUNTER — LAB OUTSIDE AN ENCOUNTER (OUTPATIENT)
Dept: URBAN - METROPOLITAN AREA CLINIC 68 | Facility: CLINIC | Age: 83
End: 2023-08-04

## 2023-08-04 ENCOUNTER — OFFICE VISIT (OUTPATIENT)
Dept: URBAN - METROPOLITAN AREA CLINIC 68 | Facility: CLINIC | Age: 83
End: 2023-08-04
Payer: MEDICARE

## 2023-08-04 VITALS
WEIGHT: 145 LBS | BODY MASS INDEX: 22.76 KG/M2 | SYSTOLIC BLOOD PRESSURE: 146 MMHG | DIASTOLIC BLOOD PRESSURE: 78 MMHG | HEIGHT: 67 IN

## 2023-08-04 DIAGNOSIS — R13.19 ESOPHAGEAL DYSPHAGIA: ICD-10-CM

## 2023-08-04 DIAGNOSIS — Z79.01 CHRONIC ANTICOAGULATION: ICD-10-CM

## 2023-08-04 DIAGNOSIS — I48.91 ATRIAL FIBRILLATION, UNSPECIFIED TYPE: ICD-10-CM

## 2023-08-04 PROBLEM — 49436004: Status: ACTIVE | Noted: 2023-08-04

## 2023-08-04 PROCEDURE — 99214 OFFICE O/P EST MOD 30 MIN: CPT | Performed by: INTERNAL MEDICINE

## 2023-08-04 RX ORDER — BUDESONIDE AND FORMOTEROL FUMARATE DIHYDRATE 160; 4.5 UG/1; UG/1
SYMBICORT( 160-4.5MCG/ACT INHALATION  TWO TIMES DAILY ) ACTIVE -HX ENTRY AEROSOL RESPIRATORY (INHALATION)
Status: ACTIVE | COMMUNITY
Start: 2022-02-14

## 2023-08-04 RX ORDER — FINASTERIDE 5 MG/1
FINASTERIDE( 5MG ORAL   ) ACTIVE -HX ENTRY TABLET, FILM COATED ORAL
Status: ACTIVE | COMMUNITY
Start: 2022-02-14

## 2023-08-04 RX ORDER — LISINOPRIL 40 MG/1
LISINOPRIL( 40MG ORAL 1 DAILY ) ACTIVE -HX ENTRY TABLET ORAL DAILY
Status: ACTIVE | COMMUNITY
Start: 2022-02-14

## 2023-08-04 RX ORDER — ROSUVASTATIN CALCIUM 40 MG/1
ROSUVASTATIN CALCIUM( 40MG ORAL  DAILY ) ACTIVE -HX ENTRY TABLET, FILM COATED ORAL DAILY
Status: ACTIVE | COMMUNITY
Start: 2022-02-14

## 2023-08-04 RX ORDER — GUAIFENESIN 400 MG 400 MG/1
GUAIFENESIN( 400MG ORAL 1 TWICE A DAY ) ACTIVE -HX ENTRY TABLET ORAL TWICE A DAY
Status: DISCONTINUED | COMMUNITY
Start: 2022-02-14

## 2023-08-04 RX ORDER — AMLODIPINE BESYLATE 10 MG/1
AMLODIPINE BESYLATE( 10MG ORAL 1/2 DAILY ) ACTIVE -HX ENTRY TABLET ORAL DAILY
Status: DISCONTINUED | COMMUNITY
Start: 2022-02-14

## 2023-08-04 RX ORDER — DABIGATRAN ETEXILATE MESYLATE 150 MG/1
PRADAXA( 150MG ORAL 1 TWICE A DAY ) ACTIVE -HX ENTRY CAPSULE ORAL TWICE A DAY
Status: ACTIVE | COMMUNITY
Start: 2022-02-14

## 2023-08-04 RX ORDER — OMEPRAZOLE 40 MG/1
CAPSULE, DELAYED RELEASE PELLETS ORAL EVERY 12 HOURS
Qty: 180 | Refills: 180 | Status: DISCONTINUED | COMMUNITY
Start: 2022-02-14

## 2023-08-04 RX ORDER — AMIODARONE HCL 200 MG
1 TABLET TABLET ORAL ONCE A DAY
Status: ACTIVE | COMMUNITY

## 2023-08-04 RX ORDER — FAMOTIDINE 40 MG/1
TAKE 1 TABLET BY MOUTH EVERY DAY TABLET, FILM COATED ORAL
Qty: 90 TABLET | Refills: 0 | Status: DISCONTINUED | COMMUNITY

## 2023-08-04 RX ORDER — ALPRAZOLAM 0.5 MG/1
ALPRAZOLAM( 0.5MG ORAL 1 THREE TIMES ) ACTIVE -HX ENTRY TABLET ORAL
Status: ACTIVE | COMMUNITY
Start: 2022-02-14

## 2023-08-04 RX ORDER — HYDRALAZINE HYDROCHLORIDE 25 MG/1
HYDRALAZINE HCL( 25MG ORAL   ) ACTIVE -HX ENTRY TABLET ORAL
Status: ACTIVE | COMMUNITY
Start: 2022-02-14

## 2023-08-04 RX ORDER — CLONIDINE 0.1 MG/D
1 PATCH TO SKIN PATCH TRANSDERMAL
Status: ACTIVE | COMMUNITY

## 2023-08-04 RX ORDER — PANTOPRAZOLE SODIUM 40 MG/1
1 TABLET TABLET, DELAYED RELEASE ORAL EVERY 12 HOURS
Qty: 180 TABLET | Status: ACTIVE | COMMUNITY
Start: 2023-06-14

## 2023-08-04 RX ORDER — SPIRONOLACTONE AND HYDROCHLOROTHIAZIDE 25; 25 MG/1; MG/1
1 TABLET TABLET ORAL ONCE A DAY
Status: ACTIVE | COMMUNITY

## 2023-08-04 RX ORDER — SILODOSIN 4 MG/1
SILODOSIN( 4MG ORAL   ) ACTIVE -HX ENTRY CAPSULE ORAL
Status: DISCONTINUED | COMMUNITY
Start: 2022-02-14

## 2023-08-04 NOTE — HPI-TODAY'S VISIT:
Case of an 82-year-old male patient that comes in today for follow-up.  Patient was recently in the ER due to uncontrolled high blood pressure.  He underwent chest CT remarkable for a thickening in the distal esophagus.  Patient also has a pertinent history of longstanding nonprogressive dysphagia.  He comes in today for follow-up. He refers he continues with dysphagia symptoms.

## 2023-08-08 ENCOUNTER — ERX REFILL RESPONSE (OUTPATIENT)
Dept: URBAN - METROPOLITAN AREA CLINIC 68 | Facility: CLINIC | Age: 83
End: 2023-08-08

## 2023-08-08 RX ORDER — PANTOPRAZOLE SODIUM 40 MG/1
TAKE 1 TABLET BY MOUTH EVERY 12 HOURS TABLET, DELAYED RELEASE ORAL
Qty: 180 TABLET | Refills: 0 | OUTPATIENT

## 2023-08-08 RX ORDER — PANTOPRAZOLE SODIUM 40 MG/1
1 TABLET TABLET, DELAYED RELEASE ORAL EVERY 12 HOURS
Qty: 180 TABLET | OUTPATIENT

## 2023-08-10 ENCOUNTER — WEB ENCOUNTER (OUTPATIENT)
Dept: URBAN - METROPOLITAN AREA CLINIC 68 | Facility: CLINIC | Age: 83
End: 2023-08-10

## 2023-08-23 ENCOUNTER — OFFICE VISIT (OUTPATIENT)
Dept: URBAN - METROPOLITAN AREA SURGERY CENTER 12 | Facility: SURGERY CENTER | Age: 83
End: 2023-08-23
Payer: MEDICARE

## 2023-08-23 ENCOUNTER — CLAIMS CREATED FROM THE CLAIM WINDOW (OUTPATIENT)
Dept: URBAN - METROPOLITAN AREA CLINIC 4 | Facility: CLINIC | Age: 83
End: 2023-08-23
Payer: MEDICARE

## 2023-08-23 DIAGNOSIS — K44.9 DIAPHRAGMATIC HERNIA WITHOUT OBSTRUCTION OR GANGRENE: ICD-10-CM

## 2023-08-23 DIAGNOSIS — K22.2 ESOPHAGEAL OBSTRUCTION: ICD-10-CM

## 2023-08-23 DIAGNOSIS — K29.70 GASTRITIS WITHOUT BLEEDING, UNSPECIFIED CHRONICITY, UNSPECIFIED GASTRITIS TYPE: ICD-10-CM

## 2023-08-23 DIAGNOSIS — B96.81 HELICOBACTER PYLORI [H. PYLORI] AS THE CAUSE OF DISEASES CLASSIFIED ELSEWHERE: ICD-10-CM

## 2023-08-23 DIAGNOSIS — K29.60 OTHER GASTRITIS WITHOUT BLEEDING: ICD-10-CM

## 2023-08-23 PROBLEM — 405247003: Status: ACTIVE | Noted: 2023-08-23

## 2023-08-23 PROBLEM — 4556007: Status: ACTIVE | Noted: 2023-08-23

## 2023-08-23 PROCEDURE — 88305 TISSUE EXAM BY PATHOLOGIST: CPT | Performed by: PATHOLOGY

## 2023-08-23 PROCEDURE — 00731 ANES UPR GI NDSC PX NOS: CPT | Performed by: NURSE ANESTHETIST, CERTIFIED REGISTERED

## 2023-08-23 PROCEDURE — 43248 EGD GUIDE WIRE INSERTION: CPT | Performed by: INTERNAL MEDICINE

## 2023-08-23 PROCEDURE — 43239 EGD BIOPSY SINGLE/MULTIPLE: CPT | Performed by: INTERNAL MEDICINE

## 2023-08-23 PROCEDURE — 88342 IMHCHEM/IMCYTCHM 1ST ANTB: CPT | Performed by: PATHOLOGY

## 2023-08-23 RX ORDER — PANTOPRAZOLE SODIUM 40 MG/1
TAKE 1 TABLET BY MOUTH EVERY 12 HOURS TABLET, DELAYED RELEASE ORAL
Qty: 180 TABLET | Refills: 0 | Status: ACTIVE | COMMUNITY

## 2023-08-23 RX ORDER — HYDRALAZINE HYDROCHLORIDE 25 MG/1
HYDRALAZINE HCL( 25MG ORAL   ) ACTIVE -HX ENTRY TABLET ORAL
Status: ACTIVE | COMMUNITY
Start: 2022-02-14

## 2023-08-23 RX ORDER — DABIGATRAN ETEXILATE MESYLATE 150 MG/1
PRADAXA( 150MG ORAL 1 TWICE A DAY ) ACTIVE -HX ENTRY CAPSULE ORAL TWICE A DAY
Status: ACTIVE | COMMUNITY
Start: 2022-02-14

## 2023-08-23 RX ORDER — CLONIDINE 0.1 MG/D
1 PATCH TO SKIN PATCH TRANSDERMAL
Status: ACTIVE | COMMUNITY

## 2023-08-23 RX ORDER — BUDESONIDE AND FORMOTEROL FUMARATE DIHYDRATE 160; 4.5 UG/1; UG/1
SYMBICORT( 160-4.5MCG/ACT INHALATION  TWO TIMES DAILY ) ACTIVE -HX ENTRY AEROSOL RESPIRATORY (INHALATION)
Status: ACTIVE | COMMUNITY
Start: 2022-02-14

## 2023-08-23 RX ORDER — ALPRAZOLAM 0.5 MG/1
ALPRAZOLAM( 0.5MG ORAL 1 THREE TIMES ) ACTIVE -HX ENTRY TABLET ORAL
Status: ACTIVE | COMMUNITY
Start: 2022-02-14

## 2023-08-23 RX ORDER — FINASTERIDE 5 MG/1
FINASTERIDE( 5MG ORAL   ) ACTIVE -HX ENTRY TABLET, FILM COATED ORAL
Status: ACTIVE | COMMUNITY
Start: 2022-02-14

## 2023-08-23 RX ORDER — ROSUVASTATIN CALCIUM 40 MG/1
ROSUVASTATIN CALCIUM( 40MG ORAL  DAILY ) ACTIVE -HX ENTRY TABLET, FILM COATED ORAL DAILY
Status: ACTIVE | COMMUNITY
Start: 2022-02-14

## 2023-08-23 RX ORDER — SPIRONOLACTONE AND HYDROCHLOROTHIAZIDE 25; 25 MG/1; MG/1
1 TABLET TABLET ORAL ONCE A DAY
Status: ACTIVE | COMMUNITY

## 2023-08-23 RX ORDER — LISINOPRIL 40 MG/1
LISINOPRIL( 40MG ORAL 1 DAILY ) ACTIVE -HX ENTRY TABLET ORAL DAILY
Status: ACTIVE | COMMUNITY
Start: 2022-02-14

## 2023-08-23 RX ORDER — AMIODARONE HCL 200 MG
1 TABLET TABLET ORAL ONCE A DAY
Status: ACTIVE | COMMUNITY

## 2023-08-26 NOTE — PHYSICAL EXAM PSYCH:
August 26, 2023      [facility]  [address]  [place] [address]  Phone: [phone]       Patient: Debbie Anna   YOB: 1979  Date of Visit: 08/26/2023    To Whom It May Concern:    Claudia Anna  was at [facility] on 08/26/2023. The patient may return to work/school on 8/31/2023 with restrictions. Wear a mask until 9/6/23. If you have any questions or concerns, or if I can be of further assistance, please do not hesitate to contact me.    Sincerely,    Chance Duran, NP      normal mood with appropriate affect

## 2023-09-01 ENCOUNTER — TELEPHONE ENCOUNTER (OUTPATIENT)
Dept: URBAN - METROPOLITAN AREA CLINIC 68 | Facility: CLINIC | Age: 83
End: 2023-09-01

## 2023-09-01 ENCOUNTER — OFFICE VISIT (OUTPATIENT)
Dept: URBAN - METROPOLITAN AREA CLINIC 68 | Facility: CLINIC | Age: 83
End: 2023-09-01
Payer: MEDICARE

## 2023-09-01 VITALS
HEIGHT: 67 IN | HEART RATE: 76 BPM | OXYGEN SATURATION: 96 % | BODY MASS INDEX: 22.76 KG/M2 | SYSTOLIC BLOOD PRESSURE: 170 MMHG | WEIGHT: 145 LBS | DIASTOLIC BLOOD PRESSURE: 70 MMHG

## 2023-09-01 DIAGNOSIS — B96.81 BACTERIAL INFECTION DUE TO H. PYLORI: ICD-10-CM

## 2023-09-01 DIAGNOSIS — R13.10 DYSPHAGIA, UNSPECIFIED TYPE: ICD-10-CM

## 2023-09-01 PROBLEM — 40739000: Status: ACTIVE | Noted: 2023-08-23

## 2023-09-01 PROCEDURE — 99214 OFFICE O/P EST MOD 30 MIN: CPT | Performed by: INTERNAL MEDICINE

## 2023-09-01 RX ORDER — AMIODARONE HCL 200 MG
1 TABLET TABLET ORAL ONCE A DAY
Status: ACTIVE | COMMUNITY

## 2023-09-01 RX ORDER — SPIRONOLACTONE AND HYDROCHLOROTHIAZIDE 25; 25 MG/1; MG/1
1 TABLET TABLET ORAL ONCE A DAY
Status: ACTIVE | COMMUNITY

## 2023-09-01 RX ORDER — ROSUVASTATIN CALCIUM 40 MG/1
ROSUVASTATIN CALCIUM( 40MG ORAL  DAILY ) ACTIVE -HX ENTRY TABLET, FILM COATED ORAL DAILY
Status: ACTIVE | COMMUNITY
Start: 2022-02-14

## 2023-09-01 RX ORDER — ALPRAZOLAM 0.5 MG/1
ALPRAZOLAM( 0.5MG ORAL 1 THREE TIMES ) ACTIVE -HX ENTRY TABLET ORAL
Status: ACTIVE | COMMUNITY
Start: 2022-02-14

## 2023-09-01 RX ORDER — FINASTERIDE 5 MG/1
FINASTERIDE( 5MG ORAL   ) ACTIVE -HX ENTRY TABLET, FILM COATED ORAL
Status: ACTIVE | COMMUNITY
Start: 2022-02-14

## 2023-09-01 RX ORDER — LISINOPRIL 40 MG/1
LISINOPRIL( 40MG ORAL 1 DAILY ) ACTIVE -HX ENTRY TABLET ORAL DAILY
Status: ACTIVE | COMMUNITY
Start: 2022-02-14

## 2023-09-01 RX ORDER — HYDRALAZINE HYDROCHLORIDE 25 MG/1
HYDRALAZINE HCL( 25MG ORAL   ) ACTIVE -HX ENTRY TABLET ORAL
Status: ACTIVE | COMMUNITY
Start: 2022-02-14

## 2023-09-01 RX ORDER — CLONIDINE 0.1 MG/D
1 PATCH TO SKIN PATCH TRANSDERMAL
Status: ACTIVE | COMMUNITY

## 2023-09-01 RX ORDER — BUDESONIDE AND FORMOTEROL FUMARATE DIHYDRATE 160; 4.5 UG/1; UG/1
SYMBICORT( 160-4.5MCG/ACT INHALATION  TWO TIMES DAILY ) ACTIVE -HX ENTRY AEROSOL RESPIRATORY (INHALATION)
Status: ACTIVE | COMMUNITY
Start: 2022-02-14

## 2023-09-01 RX ORDER — DABIGATRAN ETEXILATE MESYLATE 150 MG/1
PRADAXA( 150MG ORAL 1 TWICE A DAY ) ACTIVE -HX ENTRY CAPSULE ORAL TWICE A DAY
Status: ACTIVE | COMMUNITY
Start: 2022-02-14

## 2023-09-01 NOTE — HPI-TODAY'S VISIT:
Case of an 82-year-old male patient that comes in today for follow-up after and EGD.  Patient was recently in the ER due to uncontrolled high blood pressure.  He underwent chest CT remarkable for a thickening in the distal esophagus.  Patient also has a pertinent history of longstanding non progressive dysphagia.  He underwent EGD found with a lower esophageal ring that was dilated with Savary dilator. 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 He comes in today for follow up. He refers that his dysphagia is better since dilation. He continues with belching post prandially. Denies dyspepsia or heartburn

## 2023-11-10 ENCOUNTER — LAB OUTSIDE AN ENCOUNTER (OUTPATIENT)
Dept: URBAN - METROPOLITAN AREA CLINIC 68 | Facility: CLINIC | Age: 83
End: 2023-11-10

## 2023-11-10 ENCOUNTER — OFFICE VISIT (OUTPATIENT)
Dept: URBAN - METROPOLITAN AREA CLINIC 68 | Facility: CLINIC | Age: 83
End: 2023-11-10
Payer: MEDICARE

## 2023-11-10 VITALS
BODY MASS INDEX: 23.07 KG/M2 | HEIGHT: 67 IN | SYSTOLIC BLOOD PRESSURE: 123 MMHG | DIASTOLIC BLOOD PRESSURE: 70 MMHG | WEIGHT: 147 LBS

## 2023-11-10 DIAGNOSIS — K29.70 GASTRITIS, UNSPECIFIED, WITHOUT BLEEDING: ICD-10-CM

## 2023-11-10 DIAGNOSIS — R13.19 ESOPHAGEAL DYSPHAGIA: ICD-10-CM

## 2023-11-10 DIAGNOSIS — B96.81 HELICOBACTER PYLORI [H. PYLORI] AS THE CAUSE OF DISEASES CLASSIFIED ELSEWHERE: ICD-10-CM

## 2023-11-10 PROBLEM — 708164002: Status: ACTIVE | Noted: 2023-08-30

## 2023-11-10 PROBLEM — 40890009: Status: ACTIVE | Noted: 2023-06-14

## 2023-11-10 PROBLEM — 6185008: Status: ACTIVE | Noted: 2023-08-30

## 2023-11-10 PROCEDURE — 99214 OFFICE O/P EST MOD 30 MIN: CPT | Performed by: INTERNAL MEDICINE

## 2023-11-10 RX ORDER — AMIODARONE HCL 200 MG
1 TABLET TABLET ORAL ONCE A DAY
Status: ACTIVE | COMMUNITY

## 2023-11-10 RX ORDER — LISINOPRIL 40 MG/1
LISINOPRIL( 40MG ORAL 1 DAILY ) ACTIVE -HX ENTRY TABLET ORAL DAILY
Status: ACTIVE | COMMUNITY
Start: 2022-02-14

## 2023-11-10 RX ORDER — FINASTERIDE 5 MG/1
FINASTERIDE( 5MG ORAL   ) ACTIVE -HX ENTRY TABLET, FILM COATED ORAL
Status: ACTIVE | COMMUNITY
Start: 2022-02-14

## 2023-11-10 RX ORDER — DABIGATRAN ETEXILATE MESYLATE 150 MG/1
PRADAXA( 150MG ORAL 1 TWICE A DAY ) ACTIVE -HX ENTRY CAPSULE ORAL TWICE A DAY
Status: ACTIVE | COMMUNITY
Start: 2022-02-14

## 2023-11-10 RX ORDER — ALPRAZOLAM 0.5 MG/1
ALPRAZOLAM( 0.5MG ORAL 1 THREE TIMES ) ACTIVE -HX ENTRY TABLET ORAL
Status: ACTIVE | COMMUNITY
Start: 2022-02-14

## 2023-11-10 RX ORDER — OMEPRAZOLE 20 MG/1
1 CAPSULE CAPSULE, DELAYED RELEASE ORAL ONCE A DAY
Qty: 90 CAPSULE | OUTPATIENT
Start: 2023-11-10

## 2023-11-10 RX ORDER — BUDESONIDE AND FORMOTEROL FUMARATE DIHYDRATE 160; 4.5 UG/1; UG/1
SYMBICORT( 160-4.5MCG/ACT INHALATION  TWO TIMES DAILY ) ACTIVE -HX ENTRY AEROSOL RESPIRATORY (INHALATION)
Status: ACTIVE | COMMUNITY
Start: 2022-02-14

## 2023-11-10 RX ORDER — CLONIDINE 0.1 MG/D
1 PATCH TO SKIN PATCH TRANSDERMAL
Status: ACTIVE | COMMUNITY

## 2023-11-10 RX ORDER — SPIRONOLACTONE AND HYDROCHLOROTHIAZIDE 25; 25 MG/1; MG/1
1 TABLET TABLET ORAL ONCE A DAY
Status: DISCONTINUED | COMMUNITY

## 2023-11-10 RX ORDER — HYDRALAZINE HYDROCHLORIDE 25 MG/1
HYDRALAZINE HCL( 25MG ORAL   ) ACTIVE -HX ENTRY TABLET ORAL
Status: ACTIVE | COMMUNITY
Start: 2022-02-14

## 2023-11-10 RX ORDER — ROSUVASTATIN CALCIUM 40 MG/1
ROSUVASTATIN CALCIUM( 40MG ORAL  DAILY ) ACTIVE -HX ENTRY TABLET, FILM COATED ORAL DAILY
Status: ACTIVE | COMMUNITY
Start: 2022-02-14

## 2023-11-10 NOTE — HPI-TODAY'S VISIT:
Case of an 82-year-old male patient that comes in today for follow-up after and EGD.  Patient was recently in the ER due to uncontrolled high blood pressure.  He underwent chest CT remarkable for a thickening in the distal esophagus.  Patient also has a pertinent history of longstanding non progressive dysphagia.  He underwent EGD found with a lower esophageal ring that was dilated with Savary dilator. 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 He was referred to ID service. He comes in today for follow up. 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 He underwent UBT that was negative. His dysphagia has improve at this point.

## 2023-12-01 ENCOUNTER — OFFICE VISIT (OUTPATIENT)
Dept: URBAN - METROPOLITAN AREA CLINIC 68 | Facility: CLINIC | Age: 83
End: 2023-12-01

## 2023-12-03 ENCOUNTER — ERX REFILL RESPONSE (OUTPATIENT)
Dept: URBAN - METROPOLITAN AREA CLINIC 68 | Facility: CLINIC | Age: 83
End: 2023-12-03

## 2023-12-03 RX ORDER — PANTOPRAZOLE SODIUM 40 MG/1
TAKE 1 TABLET BY MOUTH EVERY 12 HOURS TABLET, DELAYED RELEASE ORAL
Qty: 180 TABLET | Refills: 0 | OUTPATIENT

## 2024-02-14 ENCOUNTER — LAB (OUTPATIENT)
Dept: URBAN - METROPOLITAN AREA CLINIC 68 | Facility: CLINIC | Age: 84
End: 2024-02-14

## 2024-02-14 ENCOUNTER — OV EP (OUTPATIENT)
Dept: URBAN - METROPOLITAN AREA CLINIC 68 | Facility: CLINIC | Age: 84
End: 2024-02-14
Payer: MEDICARE

## 2024-02-14 VITALS
DIASTOLIC BLOOD PRESSURE: 70 MMHG | SYSTOLIC BLOOD PRESSURE: 158 MMHG | HEIGHT: 67 IN | WEIGHT: 153 LBS | BODY MASS INDEX: 24.01 KG/M2

## 2024-02-14 DIAGNOSIS — R14.0 BLOATING: ICD-10-CM

## 2024-02-14 PROBLEM — 116289008: Status: ACTIVE | Noted: 2024-02-14

## 2024-02-14 PROCEDURE — 99214 OFFICE O/P EST MOD 30 MIN: CPT | Performed by: INTERNAL MEDICINE

## 2024-02-14 RX ORDER — CLONIDINE 0.1 MG/D
1 PATCH TO SKIN PATCH TRANSDERMAL
Status: ACTIVE | COMMUNITY

## 2024-02-14 RX ORDER — SILODOSIN 8 MG/1
1 CAPSULE WITH A MEAL CAPSULE ORAL ONCE A DAY
Status: ACTIVE | COMMUNITY

## 2024-02-14 RX ORDER — LISINOPRIL 40 MG/1
LISINOPRIL( 40MG ORAL 1 DAILY ) ACTIVE -HX ENTRY TABLET ORAL DAILY
Status: ACTIVE | COMMUNITY
Start: 2022-02-14

## 2024-02-14 RX ORDER — AMLODIPINE BESYLATE 10 MG/1
1 TABLET TABLET ORAL ONCE A DAY
Status: ACTIVE | COMMUNITY

## 2024-02-14 RX ORDER — FINASTERIDE 5 MG/1
FINASTERIDE( 5MG ORAL   ) ACTIVE -HX ENTRY TABLET, FILM COATED ORAL
Status: ACTIVE | COMMUNITY
Start: 2022-02-14

## 2024-02-14 RX ORDER — POTASSIUM CHLORIDE 1.5 G/1.58G
POTASSIUM CHLORIDE( 10MEQ ORAL DAILY ) ACTIVE -HX ENTRY POWDER, FOR SOLUTION ORAL DAILY
Status: ACTIVE | COMMUNITY
Start: 2022-02-14

## 2024-02-14 RX ORDER — PANTOPRAZOLE SODIUM 40 MG/1
TAKE 1 TABLET BY MOUTH EVERY 12 HOURS TABLET, DELAYED RELEASE ORAL
Qty: 180 TABLET | Refills: 0

## 2024-02-14 RX ORDER — DABIGATRAN ETEXILATE MESYLATE 150 MG/1
PRADAXA( 150MG ORAL 1 TWICE A DAY ) ACTIVE -HX ENTRY CAPSULE ORAL TWICE A DAY
Status: ACTIVE | COMMUNITY
Start: 2022-02-14

## 2024-02-14 RX ORDER — PANTOPRAZOLE SODIUM 40 MG/1
TAKE 1 TABLET BY MOUTH EVERY 12 HOURS TABLET, DELAYED RELEASE ORAL
Qty: 180 TABLET | Refills: 0 | Status: ACTIVE | COMMUNITY

## 2024-02-14 RX ORDER — ROSUVASTATIN CALCIUM 40 MG/1
ROSUVASTATIN CALCIUM( 40MG ORAL  DAILY ) ACTIVE -HX ENTRY TABLET, FILM COATED ORAL DAILY
Status: ACTIVE | COMMUNITY
Start: 2022-02-14

## 2024-02-14 RX ORDER — ALPRAZOLAM 0.5 MG/1
ALPRAZOLAM( 0.5MG ORAL 1 THREE TIMES ) ACTIVE -HX ENTRY TABLET ORAL
Status: ACTIVE | COMMUNITY
Start: 2022-02-14

## 2024-02-14 NOTE — HPI-TODAY'S VISIT:
Case of an 83-year-old male patient that comes in today for evaluation.  Patient refers that he has been experiencing postprandial bloating.  He also has been experiencing belching and burping.  Denies nausea vomiting.  Upon further interrogation patient does acknowledges that he has not been taking his PPI therapy.  He denies melena hematochezia hematemesis coffee-ground emesis.

## 2024-02-20 ENCOUNTER — LAB (OUTPATIENT)
Dept: URBAN - METROPOLITAN AREA CLINIC 68 | Facility: CLINIC | Age: 84
End: 2024-02-20

## 2024-02-20 ENCOUNTER — OV EP (OUTPATIENT)
Dept: URBAN - METROPOLITAN AREA CLINIC 68 | Facility: CLINIC | Age: 84
End: 2024-02-20
Payer: MEDICARE

## 2024-02-20 VITALS
SYSTOLIC BLOOD PRESSURE: 143 MMHG | HEIGHT: 67 IN | BODY MASS INDEX: 24.01 KG/M2 | WEIGHT: 153 LBS | DIASTOLIC BLOOD PRESSURE: 72 MMHG

## 2024-02-20 DIAGNOSIS — Z79.01 CHRONIC ANTICOAGULATION: ICD-10-CM

## 2024-02-20 DIAGNOSIS — I48.91 ATRIAL FIBRILLATION, UNSPECIFIED TYPE: ICD-10-CM

## 2024-02-20 DIAGNOSIS — R13.10 DYSPHAGIA, UNSPECIFIED TYPE: ICD-10-CM

## 2024-02-20 PROCEDURE — 99214 OFFICE O/P EST MOD 30 MIN: CPT | Performed by: INTERNAL MEDICINE

## 2024-02-20 RX ORDER — PANTOPRAZOLE SODIUM 40 MG/1
TAKE 1 TABLET BY MOUTH EVERY 12 HOURS TABLET, DELAYED RELEASE ORAL
Qty: 180 TABLET | Refills: 0 | Status: ACTIVE | COMMUNITY

## 2024-02-20 RX ORDER — CLONIDINE 0.1 MG/D
1 PATCH TO SKIN PATCH TRANSDERMAL
Status: ACTIVE | COMMUNITY

## 2024-02-20 RX ORDER — SILODOSIN 8 MG/1
1 CAPSULE WITH A MEAL CAPSULE ORAL ONCE A DAY
Status: ACTIVE | COMMUNITY

## 2024-02-20 RX ORDER — LISINOPRIL 40 MG/1
LISINOPRIL( 40MG ORAL 1 DAILY ) ACTIVE -HX ENTRY TABLET ORAL DAILY
Status: ACTIVE | COMMUNITY
Start: 2022-02-14

## 2024-02-20 RX ORDER — FINASTERIDE 5 MG/1
FINASTERIDE( 5MG ORAL   ) ACTIVE -HX ENTRY TABLET, FILM COATED ORAL
Status: ACTIVE | COMMUNITY
Start: 2022-02-14

## 2024-02-20 RX ORDER — DABIGATRAN ETEXILATE MESYLATE 150 MG/1
PRADAXA( 150MG ORAL 1 TWICE A DAY ) ACTIVE -HX ENTRY CAPSULE ORAL TWICE A DAY
Status: ACTIVE | COMMUNITY
Start: 2022-02-14

## 2024-02-20 RX ORDER — AMLODIPINE BESYLATE 10 MG/1
1 TABLET TABLET ORAL ONCE A DAY
Status: ACTIVE | COMMUNITY

## 2024-02-20 RX ORDER — POTASSIUM CHLORIDE 1.5 G/1.58G
POTASSIUM CHLORIDE( 10MEQ ORAL DAILY ) ACTIVE -HX ENTRY POWDER, FOR SOLUTION ORAL DAILY
Status: ACTIVE | COMMUNITY
Start: 2022-02-14

## 2024-02-20 RX ORDER — ROSUVASTATIN CALCIUM 40 MG/1
ROSUVASTATIN CALCIUM( 40MG ORAL  DAILY ) ACTIVE -HX ENTRY TABLET, FILM COATED ORAL DAILY
Status: ACTIVE | COMMUNITY
Start: 2022-02-14

## 2024-02-20 RX ORDER — ALPRAZOLAM 0.5 MG/1
ALPRAZOLAM( 0.5MG ORAL 1 THREE TIMES ) ACTIVE -HX ENTRY TABLET ORAL
Status: ACTIVE | COMMUNITY
Start: 2022-02-14

## 2024-02-20 NOTE — HPI-TODAY'S VISIT:
Case of an 83-year-old male patient that comes in today for evaluation. He has a longstanding H/O dysphagia. This has been evaluated in the past. 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 He has undergone esophageal dilation with limited response. He is set to undergo a EHSAN as part of an evaluation  for possible watchman. His cardiologist requested EGD prior to EHSAN. He comes in today for follow up. He refers he continues with dysphagia symptoms.

## 2024-03-21 ENCOUNTER — LAB (OUTPATIENT)
Dept: URBAN - METROPOLITAN AREA CLINIC 4 | Facility: CLINIC | Age: 84
End: 2024-03-21
Payer: MEDICARE

## 2024-03-21 ENCOUNTER — EGD (OUTPATIENT)
Dept: URBAN - METROPOLITAN AREA SURGERY CENTER 12 | Facility: SURGERY CENTER | Age: 84
End: 2024-03-21
Payer: MEDICARE

## 2024-03-21 DIAGNOSIS — K29.70 GASTRITIS WITHOUT BLEEDING, UNSPECIFIED CHRONICITY, UNSPECIFIED GASTRITIS TYPE: ICD-10-CM

## 2024-03-21 DIAGNOSIS — K31.89 OTHER DISEASES OF STOMACH AND DUODENUM: ICD-10-CM

## 2024-03-21 DIAGNOSIS — K22.2 ESOPHAGEAL OBSTRUCTION: ICD-10-CM

## 2024-03-21 DIAGNOSIS — K44.9 DIAPHRAGMATIC HERNIA WITHOUT OBSTRUCTION OR GANGRENE: ICD-10-CM

## 2024-03-21 PROCEDURE — 88341 IMHCHEM/IMCYTCHM EA ADD ANTB: CPT | Performed by: PATHOLOGY

## 2024-03-21 PROCEDURE — 88342 IMHCHEM/IMCYTCHM 1ST ANTB: CPT | Performed by: PATHOLOGY

## 2024-03-21 PROCEDURE — 88305 TISSUE EXAM BY PATHOLOGIST: CPT | Performed by: PATHOLOGY

## 2024-03-21 PROCEDURE — 43248 EGD GUIDE WIRE INSERTION: CPT | Performed by: INTERNAL MEDICINE

## 2024-03-21 PROCEDURE — 43239 EGD BIOPSY SINGLE/MULTIPLE: CPT | Performed by: INTERNAL MEDICINE

## 2024-03-21 RX ORDER — CLONIDINE 0.1 MG/D
1 PATCH TO SKIN PATCH TRANSDERMAL
Status: ACTIVE | COMMUNITY

## 2024-03-21 RX ORDER — POTASSIUM CHLORIDE 1.5 G/1.58G
POTASSIUM CHLORIDE( 10MEQ ORAL DAILY ) ACTIVE -HX ENTRY POWDER, FOR SOLUTION ORAL DAILY
Status: ACTIVE | COMMUNITY
Start: 2022-02-14

## 2024-03-21 RX ORDER — SILODOSIN 8 MG/1
1 CAPSULE WITH A MEAL CAPSULE ORAL ONCE A DAY
Status: ACTIVE | COMMUNITY

## 2024-03-21 RX ORDER — LISINOPRIL 40 MG/1
LISINOPRIL( 40MG ORAL 1 DAILY ) ACTIVE -HX ENTRY TABLET ORAL DAILY
Status: ACTIVE | COMMUNITY
Start: 2022-02-14

## 2024-03-21 RX ORDER — PANTOPRAZOLE SODIUM 40 MG/1
TAKE 1 TABLET BY MOUTH EVERY 12 HOURS TABLET, DELAYED RELEASE ORAL
Qty: 180 TABLET | Refills: 0 | Status: ACTIVE | COMMUNITY

## 2024-03-21 RX ORDER — ALPRAZOLAM 0.5 MG/1
ALPRAZOLAM( 0.5MG ORAL 1 THREE TIMES ) ACTIVE -HX ENTRY TABLET ORAL
Status: ACTIVE | COMMUNITY
Start: 2022-02-14

## 2024-03-21 RX ORDER — DABIGATRAN ETEXILATE MESYLATE 150 MG/1
PRADAXA( 150MG ORAL 1 TWICE A DAY ) ACTIVE -HX ENTRY CAPSULE ORAL TWICE A DAY
Status: ACTIVE | COMMUNITY
Start: 2022-02-14

## 2024-03-21 RX ORDER — ROSUVASTATIN CALCIUM 40 MG/1
ROSUVASTATIN CALCIUM( 40MG ORAL  DAILY ) ACTIVE -HX ENTRY TABLET, FILM COATED ORAL DAILY
Status: ACTIVE | COMMUNITY
Start: 2022-02-14

## 2024-03-21 RX ORDER — FINASTERIDE 5 MG/1
FINASTERIDE( 5MG ORAL   ) ACTIVE -HX ENTRY TABLET, FILM COATED ORAL
Status: ACTIVE | COMMUNITY
Start: 2022-02-14

## 2024-03-21 RX ORDER — AMLODIPINE BESYLATE 10 MG/1
1 TABLET TABLET ORAL ONCE A DAY
Status: ACTIVE | COMMUNITY

## 2024-09-06 ENCOUNTER — TELEPHONE ENCOUNTER (OUTPATIENT)
Dept: URBAN - METROPOLITAN AREA CLINIC 68 | Facility: CLINIC | Age: 84
End: 2024-09-06

## 2024-09-10 ENCOUNTER — OFFICE VISIT (OUTPATIENT)
Dept: URBAN - METROPOLITAN AREA CLINIC 68 | Facility: CLINIC | Age: 84
End: 2024-09-10
Payer: MEDICARE

## 2024-09-10 VITALS
DIASTOLIC BLOOD PRESSURE: 70 MMHG | SYSTOLIC BLOOD PRESSURE: 148 MMHG | WEIGHT: 152 LBS | BODY MASS INDEX: 23.86 KG/M2 | HEIGHT: 67 IN

## 2024-09-10 DIAGNOSIS — R13.10 DYSPHAGIA, UNSPECIFIED TYPE: ICD-10-CM

## 2024-09-10 DIAGNOSIS — R12 HEARTBURN: ICD-10-CM

## 2024-09-10 PROCEDURE — 99214 OFFICE O/P EST MOD 30 MIN: CPT | Performed by: INTERNAL MEDICINE

## 2024-09-10 RX ORDER — ROSUVASTATIN CALCIUM 40 MG/1
ROSUVASTATIN CALCIUM( 40MG ORAL  DAILY ) ACTIVE -HX ENTRY TABLET, FILM COATED ORAL DAILY
Status: ACTIVE | COMMUNITY
Start: 2022-02-14

## 2024-09-10 RX ORDER — POTASSIUM CHLORIDE 1.5 G/1.58G
POTASSIUM CHLORIDE( 10MEQ ORAL DAILY ) ACTIVE -HX ENTRY POWDER, FOR SOLUTION ORAL DAILY
Status: ACTIVE | COMMUNITY
Start: 2022-02-14

## 2024-09-10 RX ORDER — LISINOPRIL 40 MG/1
LISINOPRIL( 40MG ORAL 1 DAILY ) ACTIVE -HX ENTRY TABLET ORAL DAILY
Status: ACTIVE | COMMUNITY
Start: 2022-02-14

## 2024-09-10 RX ORDER — SILODOSIN 8 MG/1
1 CAPSULE WITH A MEAL CAPSULE ORAL ONCE A DAY
Status: ACTIVE | COMMUNITY

## 2024-09-10 RX ORDER — FINASTERIDE 5 MG/1
FINASTERIDE( 5MG ORAL   ) ACTIVE -HX ENTRY TABLET, FILM COATED ORAL
Status: ACTIVE | COMMUNITY
Start: 2022-02-14

## 2024-09-10 RX ORDER — AMLODIPINE BESYLATE 10 MG/1
1 TABLET TABLET ORAL ONCE A DAY
Status: ACTIVE | COMMUNITY

## 2024-09-10 RX ORDER — PANTOPRAZOLE SODIUM 40 MG/1
TAKE 1 TABLET BY MOUTH EVERY 12 HOURS TABLET, DELAYED RELEASE ORAL
Qty: 180 TABLET | Refills: 0 | Status: ACTIVE | COMMUNITY

## 2024-09-10 RX ORDER — ALPRAZOLAM 0.5 MG/1
ALPRAZOLAM( 0.5MG ORAL 1 THREE TIMES ) ACTIVE -HX ENTRY TABLET ORAL
Status: ACTIVE | COMMUNITY
Start: 2022-02-14

## 2024-09-10 RX ORDER — DABIGATRAN ETEXILATE MESYLATE 150 MG/1
PRADAXA( 150MG ORAL 1 TWICE A DAY ) ACTIVE -HX ENTRY CAPSULE ORAL TWICE A DAY
Status: ACTIVE | COMMUNITY
Start: 2022-02-14

## 2024-09-10 RX ORDER — CLONIDINE 0.1 MG/D
1 PATCH TO SKIN PATCH TRANSDERMAL
Status: ACTIVE | COMMUNITY

## 2024-09-10 NOTE — HPI-TODAY'S VISIT:
Case of an 83-year-old male patient that comes in today for evaluation. He has a longstanding H/O dysphagia. This has been evaluated in the past. 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 He has undergone esophageal dilation with limited response. Recently underwent MBS results are detailed below. He comes in today for follow up.He continues dysphagia symptoms.  Recently he developed some heartburn regurgitation symptomatology.  Denies melena hematochezia hematemesis coffee-ground emesis.

## 2024-09-18 ENCOUNTER — DASHBOARD ENCOUNTERS (OUTPATIENT)
Age: 84
End: 2024-09-18

## 2024-09-25 ENCOUNTER — OFFICE VISIT (OUTPATIENT)
Dept: URBAN - METROPOLITAN AREA CLINIC 68 | Facility: CLINIC | Age: 84
End: 2024-09-25
Payer: MEDICARE

## 2024-09-25 VITALS
OXYGEN SATURATION: 97 % | DIASTOLIC BLOOD PRESSURE: 80 MMHG | WEIGHT: 152 LBS | SYSTOLIC BLOOD PRESSURE: 146 MMHG | HEART RATE: 77 BPM | BODY MASS INDEX: 23.86 KG/M2 | HEIGHT: 67 IN

## 2024-09-25 DIAGNOSIS — R12 HEARTBURN: ICD-10-CM

## 2024-09-25 PROBLEM — 16331000: Status: ACTIVE | Noted: 2024-09-10

## 2024-09-25 PROCEDURE — 99203 OFFICE O/P NEW LOW 30 MIN: CPT | Performed by: INTERNAL MEDICINE

## 2024-09-25 RX ORDER — ROSUVASTATIN CALCIUM 40 MG/1
ROSUVASTATIN CALCIUM( 40MG ORAL  DAILY ) ACTIVE -HX ENTRY TABLET, FILM COATED ORAL DAILY
Status: ACTIVE | COMMUNITY
Start: 2022-02-14

## 2024-09-25 RX ORDER — POTASSIUM CHLORIDE 1.5 G/1.58G
POTASSIUM CHLORIDE( 10MEQ ORAL DAILY ) ACTIVE -HX ENTRY POWDER, FOR SOLUTION ORAL DAILY
Status: ACTIVE | COMMUNITY
Start: 2022-02-14

## 2024-09-25 RX ORDER — ALPRAZOLAM 0.5 MG/1
ALPRAZOLAM( 0.5MG ORAL 1 THREE TIMES ) ACTIVE -HX ENTRY TABLET ORAL
Status: ACTIVE | COMMUNITY
Start: 2022-02-14

## 2024-09-25 RX ORDER — FINASTERIDE 5 MG/1
FINASTERIDE( 5MG ORAL   ) ACTIVE -HX ENTRY TABLET, FILM COATED ORAL
Status: ACTIVE | COMMUNITY
Start: 2022-02-14

## 2024-09-25 RX ORDER — SILODOSIN 8 MG/1
1 CAPSULE WITH A MEAL CAPSULE ORAL ONCE A DAY
Status: ACTIVE | COMMUNITY

## 2024-09-25 RX ORDER — CLONIDINE 0.1 MG/D
1 PATCH TO SKIN PATCH TRANSDERMAL
Status: ACTIVE | COMMUNITY

## 2024-09-25 RX ORDER — PANTOPRAZOLE SODIUM 40 MG/1
TAKE 1 TABLET BY MOUTH EVERY 12 HOURS TABLET, DELAYED RELEASE ORAL
Qty: 180 TABLET | Refills: 0 | Status: ACTIVE | COMMUNITY

## 2024-09-25 RX ORDER — VONOPRAZAN FUMARATE 13.36 MG/1
1 TABLET TABLET ORAL ONCE A DAY
Qty: 90 TABLET | Refills: 1 | OUTPATIENT
Start: 2024-09-25

## 2024-09-25 RX ORDER — AMLODIPINE BESYLATE 10 MG/1
1 TABLET TABLET ORAL ONCE A DAY
Status: ACTIVE | COMMUNITY

## 2024-09-25 RX ORDER — DABIGATRAN ETEXILATE MESYLATE 150 MG/1
PRADAXA( 150MG ORAL 1 TWICE A DAY ) ACTIVE -HX ENTRY CAPSULE ORAL TWICE A DAY
Status: ACTIVE | COMMUNITY
Start: 2022-02-14

## 2024-09-25 RX ORDER — LISINOPRIL 40 MG/1
LISINOPRIL( 40MG ORAL 1 DAILY ) ACTIVE -HX ENTRY TABLET ORAL DAILY
Status: ACTIVE | COMMUNITY
Start: 2022-02-14

## 2024-09-25 RX ORDER — VONOPRAZAN FUMARATE 13.36 MG/1
1 TABLET TABLET ORAL ONCE A DAY
Status: ACTIVE | COMMUNITY

## 2024-09-25 NOTE — HPI-TODAY'S VISIT:
Case of an 83-year-old male patient that comes in today for evaluation. He has a longstanding H/O dysphagia. This has been evaluated in the past. 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 He has undergone esophageal dilation with limited response. Recently underwent MBS results are detailed below. He was also complaing of heartburn. On the last appointment he was started on Voquezna. He refers today that his heartburn is better on Voquezna. He still has some dysphagia symptoms.